# Patient Record
Sex: FEMALE | Employment: UNEMPLOYED | URBAN - METROPOLITAN AREA
[De-identification: names, ages, dates, MRNs, and addresses within clinical notes are randomized per-mention and may not be internally consistent; named-entity substitution may affect disease eponyms.]

---

## 2019-12-17 ENCOUNTER — HOSPITAL ENCOUNTER (OUTPATIENT)
Age: 49
Discharge: HOME | End: 2019-12-17
Attending: EMERGENCY MEDICINE
Payer: COMMERCIAL

## 2019-12-17 VITALS
HEART RATE: 92 BPM | OXYGEN SATURATION: 98 % | HEIGHT: 62 IN | SYSTOLIC BLOOD PRESSURE: 149 MMHG | WEIGHT: 138 LBS | TEMPERATURE: 99 F | DIASTOLIC BLOOD PRESSURE: 82 MMHG | BODY MASS INDEX: 25.4 KG/M2 | RESPIRATION RATE: 20 BRPM

## 2019-12-17 DIAGNOSIS — R10.9 ABDOMINAL PAIN, ACUTE: Primary | ICD-10-CM

## 2019-12-17 PROCEDURE — 99203 OFFICE O/P NEW LOW 30 MIN: CPT

## 2019-12-17 PROCEDURE — 99202 OFFICE O/P NEW SF 15 MIN: CPT

## 2019-12-17 RX ORDER — IBUPROFEN 600 MG/1
600 TABLET ORAL EVERY 6 HOURS PRN
COMMUNITY

## 2019-12-17 RX ORDER — CLINDAMYCIN HYDROCHLORIDE 300 MG/1
CAPSULE ORAL 3 TIMES DAILY
COMMUNITY

## 2019-12-18 NOTE — ED PROVIDER NOTES
Patient Seen in: [facility]      History   No chief complaint on file.     Stated Complaint: Abdominal pain    HPI    21-year-old female comes the hospital complaint of having difficulty with discomfort in the area of her abdomen bilaterally  Abdomen: Soft periumbilical region is tender nondistended normal active bowel sounds without rebound, guarding or masses noted  Back nontender without CVA tenderness  Extremity no clubbing, cyanosis or edema noted.   Full range of motion noted

## 2019-12-18 NOTE — ED NOTES
Pt instructed to go directly to [facility] ED, stay NPO. Pt verbalized understanding.